# Patient Record
Sex: FEMALE | Race: WHITE | NOT HISPANIC OR LATINO
[De-identification: names, ages, dates, MRNs, and addresses within clinical notes are randomized per-mention and may not be internally consistent; named-entity substitution may affect disease eponyms.]

---

## 2019-08-09 PROBLEM — Z00.00 ENCOUNTER FOR PREVENTIVE HEALTH EXAMINATION: Status: ACTIVE | Noted: 2019-08-09

## 2019-08-12 ENCOUNTER — APPOINTMENT (OUTPATIENT)
Dept: OTOLARYNGOLOGY | Facility: CLINIC | Age: 49
End: 2019-08-12
Payer: COMMERCIAL

## 2019-08-12 VITALS
WEIGHT: 168 LBS | BODY MASS INDEX: 27 KG/M2 | HEART RATE: 85 BPM | TEMPERATURE: 98.1 F | OXYGEN SATURATION: 97 % | DIASTOLIC BLOOD PRESSURE: 68 MMHG | HEIGHT: 66 IN | SYSTOLIC BLOOD PRESSURE: 103 MMHG

## 2019-08-12 PROCEDURE — 31575 DIAGNOSTIC LARYNGOSCOPY: CPT

## 2019-08-12 PROCEDURE — 99203 OFFICE O/P NEW LOW 30 MIN: CPT | Mod: 25

## 2019-08-13 ENCOUNTER — TRANSCRIPTION ENCOUNTER (OUTPATIENT)
Age: 49
End: 2019-08-13

## 2019-08-13 NOTE — HISTORY OF PRESENT ILLNESS
[de-identified] : 48F w/ PMH of asthma, who was referred for a right salivary/parotid mass that has been slowly growing per patient over two years. Patient reports that the right sided mass started around the time she had right sided dental work, but it continued to persist and even grow which prompted her to see her PCP. 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 Patient admits to mild TTP, but denies any trismus or difficulty chewing or swallowing.

## 2019-08-13 NOTE — ASSESSMENT
[FreeTextEntry1] : 48F w/ PMH of asthma, here with 2 years of enlarged right salivary vs parotid glad, no formal imaging performed, FNA path shows SUMP.\par \par Plan: \par - MRI O/F/N and then return to clinic to be seen\par - patient would like to get a second opinion and then will return to the office if decides to proceed with Dr. Kulkarni

## 2019-08-13 NOTE — REVIEW OF SYSTEMS
[As Noted in HPI] : as noted in HPI [Swelling Neck] : swelling neck [Negative] : Constitutional [Throat Dryness] : no throat dryness

## 2019-08-13 NOTE — PHYSICAL EXAM
[Normal] : no abnormal secretions [Midline] : trachea located in midline position [Laryngoscopy Performed] : laryngoscopy was performed, see procedure section for findings [de-identified] : no other palpable LNs [de-identified] : right submandibular region swelling and palpable mass, no visible masses or lesions [de-identified] : equal vocal cord movement, no masses or lesions seen

## 2019-08-16 ENCOUNTER — APPOINTMENT (OUTPATIENT)
Dept: MRI IMAGING | Facility: HOSPITAL | Age: 49
End: 2019-08-16

## 2019-08-22 ENCOUNTER — OUTPATIENT (OUTPATIENT)
Dept: OUTPATIENT SERVICES | Facility: HOSPITAL | Age: 49
LOS: 1 days | End: 2019-08-22
Payer: COMMERCIAL

## 2019-08-22 DIAGNOSIS — D49.0 NEOPLASM OF UNSPECIFIED BEHAVIOR OF DIGESTIVE SYSTEM: ICD-10-CM

## 2019-08-23 ENCOUNTER — RESULT REVIEW (OUTPATIENT)
Age: 49
End: 2019-08-23

## 2019-08-23 PROCEDURE — 88321 CONSLTJ&REPRT SLD PREP ELSWR: CPT

## 2019-08-26 ENCOUNTER — APPOINTMENT (OUTPATIENT)
Dept: OTOLARYNGOLOGY | Facility: CLINIC | Age: 49
End: 2019-08-26
Payer: COMMERCIAL

## 2019-08-26 VITALS
SYSTOLIC BLOOD PRESSURE: 115 MMHG | HEIGHT: 66 IN | HEART RATE: 64 BPM | BODY MASS INDEX: 27 KG/M2 | WEIGHT: 168 LBS | OXYGEN SATURATION: 98 % | DIASTOLIC BLOOD PRESSURE: 73 MMHG

## 2019-08-26 DIAGNOSIS — D49.0 NEOPLASM OF UNSPECIFIED BEHAVIOR OF DIGESTIVE SYSTEM: ICD-10-CM

## 2019-08-26 DIAGNOSIS — K11.8 OTHER DISEASES OF SALIVARY GLANDS: ICD-10-CM

## 2019-08-26 PROCEDURE — 99213 OFFICE O/P EST LOW 20 MIN: CPT

## 2019-08-26 NOTE — PHYSICAL EXAM
[de-identified] : Facial expression and movement symmetrical. Right tail of parotid tumour visible on examination.

## 2019-08-26 NOTE — REASON FOR VISIT
[Subsequent Evaluation] : a subsequent evaluation for [FreeTextEntry2] : right tail of the parotid tumour

## 2019-08-26 NOTE — ASSESSMENT
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 This clinically and radiographically appears to be a benign tumour. The treatment would be superficial parotidectomy. We've discussed the risks in detail including but not limited to a small risk of injury of the FN both transient or permanent (<1%), risk of kelsey syndrome, paresthesia and recovery period and anesthesia risks. Ms Irwin and her family has been getting many second opinions and have not yet committed to a surgeon. We've reassured that this operation is routine.\par \par Plan: \par 1. right superficial parotidectomy has been recommended. Ms Irwin will be in touch with our office should she want to proceed with an operation.

## 2019-08-27 LAB — NON-GYNECOLOGICAL CYTOLOGY STUDY: SIGNIFICANT CHANGE UP

## 2019-09-30 VITALS
WEIGHT: 167.55 LBS | OXYGEN SATURATION: 97 % | HEART RATE: 73 BPM | SYSTOLIC BLOOD PRESSURE: 97 MMHG | DIASTOLIC BLOOD PRESSURE: 62 MMHG | RESPIRATION RATE: 16 BRPM | HEIGHT: 66 IN | TEMPERATURE: 98 F

## 2019-09-30 NOTE — ASU PREOP CHECKLIST - SELECT TESTS ORDERED
Urinalysis/CBC/CMP/INR/CXR/EKG/PT/PTT CXR/Urinalysis/EKG/INR/CBC/CMP/PT/PTT/ucg Urinalysis/INR/CXR/CBC/PT/PTT/EKG/ucg negative/CMP

## 2019-09-30 NOTE — ASU PATIENT PROFILE, ADULT - PMH
Asthma    Chronic lower back pain    Herniated disc, cervical  C-Spine and L4-L5  Hypothyroid    Uterine fibroid Anxiety    Asthma    Chronic lower back pain    Depression    Herniated disc, cervical  C-Spine and L4-L5  Hypothyroid    In vitro fertilization    Uterine fibroid Anxiety    Asthma    Chronic lower back pain    Depression    Herniated disc, cervical  C-Spine and L4-L5  Hyperthyroidism    In vitro fertilization    Uterine fibroid

## 2019-10-01 ENCOUNTER — RESULT REVIEW (OUTPATIENT)
Age: 49
End: 2019-10-01

## 2019-10-01 ENCOUNTER — APPOINTMENT (OUTPATIENT)
Dept: OTOLARYNGOLOGY | Facility: HOSPITAL | Age: 49
End: 2019-10-01

## 2019-10-01 ENCOUNTER — OUTPATIENT (OUTPATIENT)
Dept: OUTPATIENT SERVICES | Facility: HOSPITAL | Age: 49
LOS: 1 days | Discharge: HOME | End: 2019-10-01
Payer: COMMERCIAL

## 2019-10-01 DIAGNOSIS — Z98.890 OTHER SPECIFIED POSTPROCEDURAL STATES: Chronic | ICD-10-CM

## 2019-10-01 DIAGNOSIS — Z98.891 HISTORY OF UTERINE SCAR FROM PREVIOUS SURGERY: Chronic | ICD-10-CM

## 2019-10-01 PROCEDURE — 38500 BIOPSY/REMOVAL LYMPH NODES: CPT | Mod: RT

## 2019-10-01 PROCEDURE — 42415 EXCISE PAROTID GLAND/LESION: CPT | Mod: RT

## 2019-10-01 RX ORDER — ONDANSETRON 8 MG/1
4 TABLET, FILM COATED ORAL EVERY 6 HOURS
Refills: 0 | Status: DISCONTINUED | OUTPATIENT
Start: 2019-10-01 | End: 2019-10-02

## 2019-10-01 RX ORDER — ALBUTEROL 90 UG/1
1 AEROSOL, METERED ORAL EVERY 6 HOURS
Refills: 0 | Status: DISCONTINUED | OUTPATIENT
Start: 2019-10-01 | End: 2019-10-02

## 2019-10-01 RX ORDER — ESCITALOPRAM OXALATE 10 MG/1
2.5 TABLET, FILM COATED ORAL DAILY
Refills: 0 | Status: COMPLETED | OUTPATIENT
Start: 2019-10-01 | End: 2019-10-01

## 2019-10-01 RX ORDER — GABAPENTIN 400 MG/1
300 CAPSULE ORAL DAILY
Refills: 0 | Status: DISCONTINUED | OUTPATIENT
Start: 2019-10-01 | End: 2019-10-01

## 2019-10-01 RX ORDER — SODIUM CHLORIDE 9 MG/ML
1000 INJECTION, SOLUTION INTRAVENOUS
Refills: 0 | Status: DISCONTINUED | OUTPATIENT
Start: 2019-10-01 | End: 2019-10-02

## 2019-10-01 RX ORDER — ACETAMINOPHEN 500 MG
650 TABLET ORAL EVERY 4 HOURS
Refills: 0 | Status: DISCONTINUED | OUTPATIENT
Start: 2019-10-01 | End: 2019-10-02

## 2019-10-01 RX ORDER — CEFAZOLIN SODIUM 1 G
1000 VIAL (EA) INJECTION EVERY 8 HOURS
Refills: 0 | Status: DISCONTINUED | OUTPATIENT
Start: 2019-10-01 | End: 2019-10-02

## 2019-10-01 RX ORDER — CEPHALEXIN 500 MG
1 CAPSULE ORAL
Qty: 4 | Refills: 0
Start: 2019-10-01 | End: 2019-10-01

## 2019-10-01 RX ORDER — GABAPENTIN 400 MG/1
300 CAPSULE ORAL AT BEDTIME
Refills: 0 | Status: DISCONTINUED | OUTPATIENT
Start: 2019-10-01 | End: 2019-10-02

## 2019-10-01 RX ORDER — IBUPROFEN 200 MG
400 TABLET ORAL EVERY 4 HOURS
Refills: 0 | Status: DISCONTINUED | OUTPATIENT
Start: 2019-10-01 | End: 2019-10-02

## 2019-10-01 RX ADMIN — Medication 400 MILLIGRAM(S): at 11:20

## 2019-10-01 RX ADMIN — Medication 100 MILLIGRAM(S): at 17:39

## 2019-10-01 RX ADMIN — GABAPENTIN 300 MILLIGRAM(S): 400 CAPSULE ORAL at 21:38

## 2019-10-01 RX ADMIN — Medication 650 MILLIGRAM(S): at 18:00

## 2019-10-01 RX ADMIN — Medication 400 MILLIGRAM(S): at 11:53

## 2019-10-01 RX ADMIN — Medication 650 MILLIGRAM(S): at 19:00

## 2019-10-01 RX ADMIN — SODIUM CHLORIDE 75 MILLILITER(S): 9 INJECTION, SOLUTION INTRAVENOUS at 12:01

## 2019-10-01 RX ADMIN — ESCITALOPRAM OXALATE 2.5 MILLIGRAM(S): 10 TABLET, FILM COATED ORAL at 11:53

## 2019-10-01 NOTE — BRIEF OPERATIVE NOTE - OPERATION/FINDINGS
~1cm periparotid/perifacial lymph node sent for frozen c/w benign lymph node; 1.5cm bilobed poorly encapsulated lesion in right tail of parotid

## 2019-10-01 NOTE — PROGRESS NOTE ADULT - SUBJECTIVE AND OBJECTIVE BOX
ENT POSTOP CHECK NOTE    HPI: 49yoF with R tail of parotid lesion now s/p R superficial parotidectomy.     Interval: Seen and examined at bedside. PHUONG. Doing well. pain controlled with tylenol and motrin prn. tolerating PO. Denies CP, SOB, n/v. CARMEN output minimal     Vital Signs Last 24 Hrs  T(C): 36.7 (01 Oct 2019 13:00), Max: 36.7 (01 Oct 2019 13:00)  T(F): 98 (01 Oct 2019 13:00), Max: 98 (01 Oct 2019 13:00)  HR: 77 (01 Oct 2019 13:00) (70 - 92)  BP: 100/66 (01 Oct 2019 13:00) (90/56 - 107/52)  BP(mean): 78 (01 Oct 2019 12:31) (69 - 78)  RR: 18 (01 Oct 2019 13:00) (12 - 18)  SpO2: 98% (01 Oct 2019 13:00) (97% - 100%)    PE:  NAD, A&Ox3  NC/AT, EOMI  mild R john weakness otherwise CN VII intact   Incision c/d/i, soft, flat  CARMEN bulb holding suction, sanginous  HAYNES, wwp    A/P: 49yoF with R tail of parotid lesions s/p R superficial parotidectomy 10/1    -prn pain control with tylenol/motrin  -prn nausea  -regular diet as tolerated  -d/c IVF when tolerating adequate PO  -home meds   -CARMEN drain care  -Ancef for abx ppx  -up ad alexx  -SCDs    Dispo: regular room, 23hr obs     Seen by and d/w attending

## 2019-10-01 NOTE — PROGRESS NOTE ADULT - SUBJECTIVE AND OBJECTIVE BOX
Immediately post-op from right parotidectomy, patient was asked to smile and pucker her lips, both showing no signs of facial nerve paresis. Patient was later seen by attending physician within one hour after surgery and patient showed signs of mild marginal branch nerve paresis. Immediately post-op from right parotidectomy, patient was asked to smile and pucker her lips, both showing no signs of facial nerve paresis. Patient was later seen by attending physician within one hour after surgery and patient showed signs of mild marginal branch nerve paresis. Will continue to monitor this throughout post-op course.

## 2019-10-02 VITALS
SYSTOLIC BLOOD PRESSURE: 103 MMHG | DIASTOLIC BLOOD PRESSURE: 69 MMHG | TEMPERATURE: 98 F | HEART RATE: 73 BPM | OXYGEN SATURATION: 95 % | RESPIRATION RATE: 14 BRPM

## 2019-10-02 LAB — SURGICAL PATHOLOGY STUDY: SIGNIFICANT CHANGE UP

## 2019-10-02 PROCEDURE — 42415 EXCISE PAROTID GLAND/LESION: CPT | Mod: RT

## 2019-10-02 PROCEDURE — C1889: CPT

## 2019-10-02 PROCEDURE — 88307 TISSUE EXAM BY PATHOLOGIST: CPT

## 2019-10-02 PROCEDURE — 88333 PATH CONSLTJ SURG CYTO XM 1: CPT

## 2019-10-02 PROCEDURE — 88331 PATH CONSLTJ SURG 1 BLK 1SPC: CPT

## 2019-10-02 PROCEDURE — 88305 TISSUE EXAM BY PATHOLOGIST: CPT

## 2019-10-02 RX ORDER — GABAPENTIN 400 MG/1
1 CAPSULE ORAL
Qty: 0 | Refills: 0 | DISCHARGE
Start: 2019-10-02

## 2019-10-02 RX ORDER — ACETAMINOPHEN 500 MG
2 TABLET ORAL
Qty: 0 | Refills: 0 | DISCHARGE
Start: 2019-10-02

## 2019-10-02 RX ORDER — IBUPROFEN 200 MG
1 TABLET ORAL
Qty: 0 | Refills: 0 | DISCHARGE
Start: 2019-10-02

## 2019-10-02 RX ORDER — BENZOCAINE AND MENTHOL 5; 1 G/100ML; G/100ML
1 LIQUID ORAL
Refills: 0 | Status: DISCONTINUED | OUTPATIENT
Start: 2019-10-02 | End: 2019-10-02

## 2019-10-02 RX ADMIN — Medication 400 MILLIGRAM(S): at 05:35

## 2019-10-02 RX ADMIN — Medication 100 MILLIGRAM(S): at 00:07

## 2019-10-02 RX ADMIN — Medication 400 MILLIGRAM(S): at 00:11

## 2019-10-02 RX ADMIN — BENZOCAINE AND MENTHOL 1 LOZENGE: 5; 1 LIQUID ORAL at 05:33

## 2019-10-02 RX ADMIN — Medication 400 MILLIGRAM(S): at 01:11

## 2019-10-02 RX ADMIN — Medication 100 MILLIGRAM(S): at 09:57

## 2019-10-02 RX ADMIN — Medication 400 MILLIGRAM(S): at 06:35

## 2019-10-02 NOTE — PROGRESS NOTE ADULT - SUBJECTIVE AND OBJECTIVE BOX
ENT Progress NOTE    HPI: 49yoF with R tail of parotid lesion now POD1 s/p R superficial parotidectomy.     Interval: Seen and examined at bedside. PHUONG. Doing well. pain controlled with tylenol and motrin prn. tolerating PO. Denies CP, SOB, n/v. CARMEN output minimal, removed     Vital Signs Last 24 Hrs  T(C): 36.9 (02 Oct 2019 08:38), Max: 37.1 (01 Oct 2019 15:51)  T(F): 98.4 (02 Oct 2019 08:38), Max: 98.7 (01 Oct 2019 15:51)  HR: 73 (02 Oct 2019 08:38) (65 - 88)  BP: 103/69 (02 Oct 2019 08:38) (90/56 - 107/68)  BP(mean): 78 (01 Oct 2019 12:31) (71 - 78)  RR: 14 (02 Oct 2019 08:38) (14 - 18)  SpO2: 95% (02 Oct 2019 08:38) (95% - 99%)Vital Signs Last 24 Hrs  T(C): 36.7 (01 Oct 2019 13:00), Max: 36.7 (01 Oct 2019 13:00)  T(F): 98 (01 Oct 2019 13:00), Max: 98 (01 Oct 2019 13:00)  HR: 77 (01 Oct 2019 13:00) (70 - 92)  BP: 100/66 (01 Oct 2019 13:00) (90/56 - 107/52)  BP(mean): 78 (01 Oct 2019 12:31) (69 - 78)  RR: 18 (01 Oct 2019 13:00) (12 - 18)  SpO2: 98% (01 Oct 2019 13:00) (97% - 100%)    PE:  NAD, A&Ox3  NC/AT, EOMI  mild R john weakness otherwise CN VII intact   Incision c/d/i, soft, flat  CARMEN bulb holding suction, sanginous- removed   HAYNES, wwp    A/P: 49yoF with R tail of parotid lesions s/p R superficial parotidectomy 10/1, progressing well postoperatively   - Stable for discharge home   - Tylenol/motrin PRN for pain   - Return precautions discussed with patient, verbalized understanding   - Follow up in 1 week with Dr. Kulkarni

## 2019-10-04 PROBLEM — F32.9 MAJOR DEPRESSIVE DISORDER, SINGLE EPISODE, UNSPECIFIED: Chronic | Status: ACTIVE | Noted: 2019-09-30

## 2019-10-04 PROBLEM — J45.909 UNSPECIFIED ASTHMA, UNCOMPLICATED: Chronic | Status: ACTIVE | Noted: 2019-09-30

## 2019-10-04 PROBLEM — M54.5 LOW BACK PAIN: Chronic | Status: ACTIVE | Noted: 2019-09-30

## 2019-10-04 PROBLEM — Z31.83 ENCOUNTER FOR ASSISTED REPRODUCTIVE FERTILITY PROCEDURE CYCLE: Chronic | Status: ACTIVE | Noted: 2019-09-30

## 2019-10-04 PROBLEM — F41.9 ANXIETY DISORDER, UNSPECIFIED: Chronic | Status: ACTIVE | Noted: 2019-09-30

## 2019-10-04 PROBLEM — D25.9 LEIOMYOMA OF UTERUS, UNSPECIFIED: Chronic | Status: ACTIVE | Noted: 2019-09-30

## 2019-10-04 PROBLEM — E05.90 THYROTOXICOSIS, UNSPECIFIED WITHOUT THYROTOXIC CRISIS OR STORM: Chronic | Status: ACTIVE | Noted: 2019-10-01

## 2019-10-04 PROBLEM — M50.20 OTHER CERVICAL DISC DISPLACEMENT, UNSPECIFIED CERVICAL REGION: Chronic | Status: ACTIVE | Noted: 2019-09-30

## 2019-10-07 ENCOUNTER — APPOINTMENT (OUTPATIENT)
Dept: OTOLARYNGOLOGY | Facility: CLINIC | Age: 49
End: 2019-10-07
Payer: COMMERCIAL

## 2019-10-07 VITALS
BODY MASS INDEX: 27 KG/M2 | HEART RATE: 73 BPM | SYSTOLIC BLOOD PRESSURE: 105 MMHG | OXYGEN SATURATION: 98 % | HEIGHT: 66 IN | DIASTOLIC BLOOD PRESSURE: 72 MMHG | WEIGHT: 168 LBS

## 2019-10-07 PROCEDURE — 99024 POSTOP FOLLOW-UP VISIT: CPT

## 2019-10-07 NOTE — ASSESSMENT
[FreeTextEntry1] : Assessment: \par 1. pleomorphic adenoma of right parotid removed- POD6 from superficial parotidectomy\par 2. residual right marginal mandibular nerve weakness-suspect neuropraxia from swelling\par \par Plan: \par 1. f/u 1 month to reassess marginal mandibular nerve recovery\par 2. will need surveillance MRI at 6 month \par 3. avoid heavy lifting for an additional week, may return to work with modified activities.

## 2019-10-07 NOTE — PHYSICAL EXAM
[Normal] : no mass and no adenopathy [de-identified] : Neck incision healing well.  [de-identified] : Almost invisible facial incision going retrotragally. Earlobe in perfect position. \par No seroma/hematoma. Mild bruising from NIM periorally. \par Right marginal mandibular nerve weakness, but tone is normal. Small area of injury in right lower mucosal lip from biting. \par Otherwise, facial nerve function is preserved.

## 2019-10-07 NOTE — HISTORY OF PRESENT ILLNESS
[de-identified] : 50 yo F POD6 from R superficial parotidectomy for pleomorphic adenoma. \par  [FreeTextEntry1] : Pt seen in clinic, recovered well post op. \par In PACU developed R marginal mandibular nerve weakness which is persisting. Because of that she is biting her lower lip intermittently. \par No facial swelling, no Sx of Nithya syndrome. Facial numbness and earlobe numbness.

## 2019-11-04 ENCOUNTER — APPOINTMENT (OUTPATIENT)
Dept: OTOLARYNGOLOGY | Facility: CLINIC | Age: 49
End: 2019-11-04
Payer: COMMERCIAL

## 2019-11-04 VITALS
HEIGHT: 66 IN | OXYGEN SATURATION: 97 % | WEIGHT: 168 LBS | DIASTOLIC BLOOD PRESSURE: 73 MMHG | HEART RATE: 80 BPM | BODY MASS INDEX: 27 KG/M2 | SYSTOLIC BLOOD PRESSURE: 114 MMHG

## 2019-11-04 PROCEDURE — 99024 POSTOP FOLLOW-UP VISIT: CPT

## 2019-11-04 NOTE — ASSESSMENT
[FreeTextEntry1] : Assessment: \par 1. complete removal of pleomorphic adenoma of right parotid-needs surveillance imaging 6 months post op\par 2. residual right marginal mandibular nerve weakness\par \par Talked about nerve regeneration and that it may take 6-9 months. The fact that she is developing some twitching in the left lower lip is promising. Talked about balancing smile by weakening right OLLIE with neuromodulators. She will think about it. \par \par Plan: \par 1. Follow up in 6 weeks to reassess marginal nerve recovery\par 2. needs MRI 6 months post op\par 3. May resume normal activity

## 2019-11-04 NOTE — HISTORY OF PRESENT ILLNESS
[de-identified] : 48 yo F post right superficial parotidectomy for pleomorphic adenoma. Postoperative left marginal mandibular nerve weakness.  [FreeTextEntry1] : Since our last follow up developed slight sensation of movement in left lower lip. Numbness in left face and neck improving. Scar is camouflaged nicely. Provided a work note today. \par Ongoing lip asymmetry and intermittently she bites left lower lip because of weakness in marginal mandibular nerve.

## 2019-11-04 NOTE — PHYSICAL EXAM
[de-identified] : :Almost invisible facial incision going retrotragally. Earlobe in great position. \par \par Right marginal mandibular nerve weakness, but tone is normal. \par \par Otherwise, facial nerve function is preserved.

## 2019-12-16 ENCOUNTER — APPOINTMENT (OUTPATIENT)
Dept: OTOLARYNGOLOGY | Facility: CLINIC | Age: 49
End: 2019-12-16
Payer: COMMERCIAL

## 2019-12-16 VITALS
SYSTOLIC BLOOD PRESSURE: 126 MMHG | DIASTOLIC BLOOD PRESSURE: 79 MMHG | HEIGHT: 66 IN | OXYGEN SATURATION: 98 % | HEART RATE: 84 BPM | WEIGHT: 168 LBS | BODY MASS INDEX: 27 KG/M2

## 2019-12-16 PROCEDURE — 99024 POSTOP FOLLOW-UP VISIT: CPT

## 2019-12-16 NOTE — HISTORY OF PRESENT ILLNESS
[de-identified] : 50 yo F post right superficial parotidectomy for pleomorphic adenoma. Postoperative left marginal mandibular nerve weakness. \par  [FreeTextEntry1] : Interval History: Since our last follow up developed objective movement in right lower lip. Numbness in right face and neck improving. Scar has healed. \par

## 2019-12-16 NOTE — PHYSICAL EXAM
[de-identified] : Improved right lower lip asymmetry. Improved tone. Asymmetry only obvious with big smile. \par Noticed lack of platysmal tightening on right side, that is secondary to surgery and will take 6 months from OR to recover because platysma is routinely transected in parotidectomy.

## 2019-12-16 NOTE — ASSESSMENT
[FreeTextEntry1] : Assessment: \par 1. right marginal mandibular weakness improving\par 2. removed pleomorphic adenoma of right parotid-needs surveillance imaging\par \par Plan: \par 1. needs surveillance MRI in March-April 2020\par 2. F/U 3 months for reassessment

## 2022-08-25 ENCOUNTER — APPOINTMENT (OUTPATIENT)
Dept: MRI IMAGING | Facility: HOSPITAL | Age: 52
End: 2022-08-25

## 2022-10-20 ENCOUNTER — APPOINTMENT (OUTPATIENT)
Dept: SPINE | Facility: CLINIC | Age: 52
End: 2022-10-20

## 2022-10-20 DIAGNOSIS — Z86.39 PERSONAL HISTORY OF OTHER ENDOCRINE, NUTRITIONAL AND METABOLIC DISEASE: ICD-10-CM

## 2022-10-20 DIAGNOSIS — D49.2 NEOPLASM OF UNSPECIFIED BEHAVIOR OF BONE, SOFT TISSUE, AND SKIN: ICD-10-CM

## 2022-10-20 DIAGNOSIS — J45.20 MILD INTERMITTENT ASTHMA, UNCOMPLICATED: ICD-10-CM

## 2022-10-20 DIAGNOSIS — F12.91 CANNABIS USE, UNSPECIFIED, IN REMISSION: ICD-10-CM

## 2022-10-20 DIAGNOSIS — Z80.8 FAMILY HISTORY OF MALIGNANT NEOPLASM OF OTHER ORGANS OR SYSTEMS: ICD-10-CM

## 2022-10-20 DIAGNOSIS — Z87.891 PERSONAL HISTORY OF NICOTINE DEPENDENCE: ICD-10-CM

## 2022-10-20 PROCEDURE — 99203 OFFICE O/P NEW LOW 30 MIN: CPT | Mod: 95

## 2022-10-20 PROCEDURE — 99443: CPT | Mod: 95

## 2022-10-20 RX ORDER — DEXTROAMPHETAMINE SACCHARATE, AMPHETAMINE ASPARTATE, DEXTROAMPHETAMINE SULFATE AND AMPHETAMINE SULFATE 1.25; 1.25; 1.25; 1.25 MG/1; MG/1; MG/1; MG/1
5 TABLET ORAL
Qty: 60 | Refills: 0 | Status: ACTIVE | COMMUNITY
Start: 2022-05-05

## 2022-10-20 RX ORDER — BECLOMETHASONE DIPROPIONATE HFA 40 UG/1
40 AEROSOL, METERED RESPIRATORY (INHALATION)
Qty: 10 | Refills: 0 | Status: ACTIVE | COMMUNITY
Start: 2022-08-10

## 2022-10-20 RX ORDER — LAMOTRIGINE 25 MG/1
25 TABLET ORAL
Qty: 45 | Refills: 0 | Status: ACTIVE | COMMUNITY
Start: 2022-05-26

## 2022-10-20 RX ORDER — GABAPENTIN 300 MG/1
300 CAPSULE ORAL
Qty: 90 | Refills: 0 | Status: ACTIVE | COMMUNITY
Start: 2022-05-09

## 2022-10-20 RX ORDER — CLINDAMYCIN PHOSPHATE 10 MG/ML
1 LOTION TOPICAL
Qty: 60 | Refills: 0 | Status: ACTIVE | COMMUNITY
Start: 2022-07-13

## 2022-10-20 RX ORDER — COVID-19 MOLECULAR TEST ASSAY
KIT MISCELLANEOUS
Qty: 1 | Refills: 0 | Status: ACTIVE | COMMUNITY
Start: 2022-09-19

## 2022-10-20 NOTE — ASSESSMENT
[FreeTextEntry1] : Incidental finding of thoracic vertebral body lesion during ENT follow up for parotid tumor. \par \par PLAN\par - MRI T-spine w/wo to r/o malignancy given recent h/o parotid adenoma\par - f/u after images to review with Dr. Mane

## 2022-10-20 NOTE — PHYSICAL EXAM
[de-identified] : MR neck w/wo on 8/25/2022 @ [facility] showed 7 mm hyperintense lesion @ T3 vertebral body.

## 2022-11-01 PROBLEM — Z87.891 FORMER SMOKER: Status: ACTIVE | Noted: 2019-08-13

## 2022-11-01 PROBLEM — J45.20 MILD INTERMITTENT ASTHMA: Status: RESOLVED | Noted: 2019-08-13 | Resolved: 2022-11-01

## 2022-11-01 PROBLEM — F12.91 HISTORY OF MARIJUANA USE: Status: ACTIVE | Noted: 2019-08-13

## 2022-11-01 PROBLEM — D49.2 THORACIC SPINE TUMOR: Status: ACTIVE | Noted: 2022-10-20

## 2022-11-01 PROBLEM — Z86.39 HISTORY OF HYPERTHYROIDISM: Status: RESOLVED | Noted: 2019-08-13 | Resolved: 2022-11-01

## 2022-11-01 PROBLEM — Z80.8 FAMILY HISTORY OF MALIGNANT NEOPLASM OF SKIN: Status: ACTIVE | Noted: 2019-08-13

## 2022-11-01 NOTE — HISTORY OF PRESENT ILLNESS
[de-identified] : 53 yo F with parotid tumor (s/p resection, pathology- pleomorphic adenoma) presents recent MRI finding of thoracic spine enhancing lesion.\par She denies back pain, LE weakness, balance problem BB, dysfunction.

## 2022-11-01 NOTE — REASON FOR VISIT
[Initial Visit] : an initial visit for [FreeTextEntry2] : thoracic spine lesion, referred by CHRISTO (Dr. Babin)

## 2022-11-01 NOTE — PHYSICAL EXAM
[de-identified] : MR neck w/wo on 8/25/2022 @ [facility] showed 7 mm hyperintense lesion @ T3 vertebral body.

## 2023-03-14 ENCOUNTER — APPOINTMENT (OUTPATIENT)
Dept: SPINE | Facility: CLINIC | Age: 53
End: 2023-03-14
Payer: COMMERCIAL

## 2023-03-14 DIAGNOSIS — M47.814 SPONDYLOSIS W/OUT MYELOPATHY OR RADICULOPATHY, THORACIC REGION: ICD-10-CM

## 2023-03-14 PROCEDURE — 99214 OFFICE O/P EST MOD 30 MIN: CPT | Mod: 95

## 2023-05-10 PROBLEM — M47.814 THORACIC SPONDYLOSIS: Status: ACTIVE | Noted: 2023-05-10

## 2023-05-10 NOTE — REASON FOR VISIT
[Home] : at home, [unfilled] , at the time of the visit. [Medical Office: ([facility] [place]___] : at the medical office located in  [Patient] : the patient [Self] : self [Follow-Up Visit] : a follow-up visit for

## 2023-05-10 NOTE — DISCUSSION/SUMMARY
[de-identified] : This lesion is completely benign and nothing to worry about.  She needs no repeat imaging.\par \par Isra Mane M.D., M.Sc.\par \par Department of Neurosurgery\par [facility]\par [facility]\par [facility]\par [place], [place]\par [email or web address]\par [phone]

## 2023-10-04 ENCOUNTER — NON-APPOINTMENT (OUTPATIENT)
Age: 53
End: 2023-10-04

## 2024-02-13 ENCOUNTER — NON-APPOINTMENT (OUTPATIENT)
Age: 54
End: 2024-02-13

## 2024-06-13 ENCOUNTER — NON-APPOINTMENT (OUTPATIENT)
Age: 54
End: 2024-06-13

## 2024-10-21 ENCOUNTER — NON-APPOINTMENT (OUTPATIENT)
Age: 54
End: 2024-10-21

## 2024-11-04 ENCOUNTER — NON-APPOINTMENT (OUTPATIENT)
Age: 54
End: 2024-11-04

## 2024-11-12 ENCOUNTER — NON-APPOINTMENT (OUTPATIENT)
Age: 54
End: 2024-11-12

## 2024-11-12 ENCOUNTER — APPOINTMENT (OUTPATIENT)
Dept: PULMONOLOGY | Facility: CLINIC | Age: 54
End: 2024-11-12
Payer: COMMERCIAL

## 2024-11-12 VITALS
WEIGHT: 175 LBS | TEMPERATURE: 97.7 F | HEIGHT: 66 IN | BODY MASS INDEX: 28.12 KG/M2 | OXYGEN SATURATION: 96 % | DIASTOLIC BLOOD PRESSURE: 75 MMHG | HEART RATE: 86 BPM | SYSTOLIC BLOOD PRESSURE: 118 MMHG

## 2024-11-12 DIAGNOSIS — R06.02 SHORTNESS OF BREATH: ICD-10-CM

## 2024-11-12 DIAGNOSIS — J30.2 OTHER SEASONAL ALLERGIC RHINITIS: ICD-10-CM

## 2024-11-12 DIAGNOSIS — J45.40 MODERATE PERSISTENT ASTHMA, UNCOMPLICATED: ICD-10-CM

## 2024-11-12 PROCEDURE — G2211 COMPLEX E/M VISIT ADD ON: CPT | Mod: NC

## 2024-11-12 PROCEDURE — 99204 OFFICE O/P NEW MOD 45 MIN: CPT

## 2024-11-12 RX ORDER — BUDESONIDE AND FORMOTEROL FUMARATE DIHYDRATE 160; 4.5 UG/1; UG/1
160-4.5 AEROSOL RESPIRATORY (INHALATION) TWICE DAILY
Qty: 1 | Refills: 11 | Status: ACTIVE | COMMUNITY
Start: 2024-11-12 | End: 1900-01-01

## 2024-11-12 RX ORDER — ALBUTEROL 90 MCG
AEROSOL (GRAM) INHALATION
Refills: 0 | Status: ACTIVE | COMMUNITY

## 2025-01-14 ENCOUNTER — APPOINTMENT (OUTPATIENT)
Dept: PULMONOLOGY | Facility: CLINIC | Age: 55
End: 2025-01-14
Payer: COMMERCIAL

## 2025-01-14 VITALS
HEIGHT: 66 IN | SYSTOLIC BLOOD PRESSURE: 114 MMHG | TEMPERATURE: 98.1 F | WEIGHT: 175 LBS | OXYGEN SATURATION: 94 % | BODY MASS INDEX: 28.12 KG/M2 | DIASTOLIC BLOOD PRESSURE: 73 MMHG | HEART RATE: 78 BPM

## 2025-01-14 DIAGNOSIS — J45.40 MODERATE PERSISTENT ASTHMA, UNCOMPLICATED: ICD-10-CM

## 2025-01-14 DIAGNOSIS — J30.2 OTHER SEASONAL ALLERGIC RHINITIS: ICD-10-CM

## 2025-01-14 DIAGNOSIS — R06.02 SHORTNESS OF BREATH: ICD-10-CM

## 2025-01-14 PROCEDURE — 94060 EVALUATION OF WHEEZING: CPT

## 2025-01-14 PROCEDURE — G2211 COMPLEX E/M VISIT ADD ON: CPT | Mod: NC

## 2025-01-14 PROCEDURE — 99213 OFFICE O/P EST LOW 20 MIN: CPT | Mod: 25

## 2025-01-14 PROCEDURE — 94726 PLETHYSMOGRAPHY LUNG VOLUMES: CPT

## 2025-01-14 PROCEDURE — 94729 DIFFUSING CAPACITY: CPT

## 2025-01-14 PROCEDURE — ZZZZZ: CPT

## 2025-02-25 ENCOUNTER — NON-APPOINTMENT (OUTPATIENT)
Age: 55
End: 2025-02-25

## 2025-03-12 ENCOUNTER — APPOINTMENT (OUTPATIENT)
Facility: CLINIC | Age: 55
End: 2025-03-12
Payer: COMMERCIAL

## 2025-03-12 VITALS
SYSTOLIC BLOOD PRESSURE: 106 MMHG | WEIGHT: 170 LBS | HEIGHT: 63 IN | DIASTOLIC BLOOD PRESSURE: 66 MMHG | TEMPERATURE: 98.4 F | BODY MASS INDEX: 30.12 KG/M2 | HEART RATE: 89 BPM | OXYGEN SATURATION: 97 %

## 2025-03-12 DIAGNOSIS — Z00.00 ENCOUNTER FOR GENERAL ADULT MEDICAL EXAMINATION W/OUT ABNORMAL FINDINGS: ICD-10-CM

## 2025-03-12 DIAGNOSIS — Z80.3 FAMILY HISTORY OF MALIGNANT NEOPLASM OF BREAST: ICD-10-CM

## 2025-03-12 DIAGNOSIS — Z82.49 FAMILY HISTORY OF ISCHEMIC HEART DISEASE AND OTHER DISEASES OF THE CIRCULATORY SYSTEM: ICD-10-CM

## 2025-03-12 DIAGNOSIS — Z83.438 FAMILY HISTORY OF OTHER DISORDER OF LIPOPROTEIN METABOLISM AND OTHER LIPIDEMIA: ICD-10-CM

## 2025-03-12 PROCEDURE — 36415 COLL VENOUS BLD VENIPUNCTURE: CPT

## 2025-03-12 PROCEDURE — 99386 PREV VISIT NEW AGE 40-64: CPT

## 2025-03-12 RX ORDER — ESCITALOPRAM OXALATE 5 MG/1
TABLET, FILM COATED ORAL
Refills: 0 | Status: ACTIVE | COMMUNITY

## 2025-03-13 ENCOUNTER — TRANSCRIPTION ENCOUNTER (OUTPATIENT)
Age: 55
End: 2025-03-13

## 2025-03-13 LAB
25(OH)D3 SERPL-MCNC: 57.3 NG/ML
ALBUMIN SERPL ELPH-MCNC: 4.2 G/DL
ALP BLD-CCNC: 77 U/L
ALT SERPL-CCNC: 24 U/L
ANION GAP SERPL CALC-SCNC: 12 MMOL/L
AST SERPL-CCNC: 25 U/L
BASOPHILS # BLD AUTO: 0.04 K/UL
BASOPHILS NFR BLD AUTO: 0.5 %
BILIRUB SERPL-MCNC: 0.2 MG/DL
BUN SERPL-MCNC: 12 MG/DL
CALCIUM SERPL-MCNC: 9.6 MG/DL
CHLORIDE SERPL-SCNC: 107 MMOL/L
CO2 SERPL-SCNC: 23 MMOL/L
CREAT SERPL-MCNC: 0.7 MG/DL
EGFRCR SERPLBLD CKD-EPI 2021: 103 ML/MIN/1.73M2
EOSINOPHIL # BLD AUTO: 0.05 K/UL
EOSINOPHIL NFR BLD AUTO: 0.7 %
ESTIMATED AVERAGE GLUCOSE: 108 MG/DL
GLUCOSE SERPL-MCNC: 113 MG/DL
HBA1C MFR BLD HPLC: 5.4 %
HCT VFR BLD CALC: 39.9 %
HGB BLD-MCNC: 13.5 G/DL
IMM GRANULOCYTES NFR BLD AUTO: 0.3 %
LYMPHOCYTES # BLD AUTO: 2.19 K/UL
LYMPHOCYTES NFR BLD AUTO: 29.1 %
MAN DIFF?: NORMAL
MCHC RBC-ENTMCNC: 29.5 PG
MCHC RBC-ENTMCNC: 33.8 G/DL
MCV RBC AUTO: 87.3 FL
MONOCYTES # BLD AUTO: 0.53 K/UL
MONOCYTES NFR BLD AUTO: 7 %
NEUTROPHILS # BLD AUTO: 4.69 K/UL
NEUTROPHILS NFR BLD AUTO: 62.4 %
PLATELET # BLD AUTO: 253 K/UL
POTASSIUM SERPL-SCNC: 4.3 MMOL/L
PROT SERPL-MCNC: 6.5 G/DL
RBC # BLD: 4.57 M/UL
RBC # FLD: 12.7 %
SODIUM SERPL-SCNC: 142 MMOL/L
TSH SERPL-ACNC: 1.99 UIU/ML
VIT B12 SERPL-MCNC: 1210 PG/ML
WBC # FLD AUTO: 7.52 K/UL

## 2025-03-14 LAB
C TRACH RRNA SPEC QL NAA+PROBE: NOT DETECTED
HIV1+2 AB SPEC QL IA.RAPID: NONREACTIVE
MEV IGG FLD QL IA: 119 AU/ML
MEV IGG+IGM SER-IMP: POSITIVE
MUV AB SER-ACNC: POSITIVE
MUV IGG SER QL IA: 41.2 AU/ML
N GONORRHOEA RRNA SPEC QL NAA+PROBE: NOT DETECTED
RUBV IGG FLD-ACNC: 2.26 INDEX
RUBV IGG SER-IMP: POSITIVE
SOURCE AMPLIFICATION: NORMAL
T PALLIDUM AB SER QL IA: NEGATIVE
VZV AB TITR SER: POSITIVE
VZV IGG SER IF-ACNC: 30.7 S/CO

## 2025-03-18 ENCOUNTER — TRANSCRIPTION ENCOUNTER (OUTPATIENT)
Age: 55
End: 2025-03-18

## 2025-03-18 LAB
CHOLEST SERPL-MCNC: 182 MG/DL
HDLC SERPL-MCNC: 50 MG/DL
LDLC SERPL CALC-MCNC: 111 MG/DL
NONHDLC SERPL-MCNC: 133 MG/DL
TRIGL SERPL-MCNC: 123 MG/DL

## 2025-03-19 ENCOUNTER — TRANSCRIPTION ENCOUNTER (OUTPATIENT)
Age: 55
End: 2025-03-19

## 2025-03-27 ENCOUNTER — TRANSCRIPTION ENCOUNTER (OUTPATIENT)
Age: 55
End: 2025-03-27

## 2025-03-31 ENCOUNTER — TRANSCRIPTION ENCOUNTER (OUTPATIENT)
Age: 55
End: 2025-03-31

## 2025-04-21 ENCOUNTER — APPOINTMENT (OUTPATIENT)
Dept: OTOLARYNGOLOGY | Facility: CLINIC | Age: 55
End: 2025-04-21
Payer: COMMERCIAL

## 2025-04-21 DIAGNOSIS — Z78.9 OTHER SPECIFIED HEALTH STATUS: ICD-10-CM

## 2025-04-21 DIAGNOSIS — D49.0 NEOPLASM OF UNSPECIFIED BEHAVIOR OF DIGESTIVE SYSTEM: ICD-10-CM

## 2025-04-21 PROCEDURE — 99203 OFFICE O/P NEW LOW 30 MIN: CPT

## 2025-04-22 ENCOUNTER — APPOINTMENT (OUTPATIENT)
Dept: PULMONOLOGY | Facility: CLINIC | Age: 55
End: 2025-04-22
Payer: COMMERCIAL

## 2025-04-22 VITALS
SYSTOLIC BLOOD PRESSURE: 98 MMHG | WEIGHT: 167 LBS | HEART RATE: 87 BPM | OXYGEN SATURATION: 96 % | DIASTOLIC BLOOD PRESSURE: 55 MMHG | HEIGHT: 63 IN | BODY MASS INDEX: 29.59 KG/M2 | TEMPERATURE: 98.4 F

## 2025-04-22 DIAGNOSIS — J30.2 OTHER SEASONAL ALLERGIC RHINITIS: ICD-10-CM

## 2025-04-22 DIAGNOSIS — R06.02 SHORTNESS OF BREATH: ICD-10-CM

## 2025-04-22 DIAGNOSIS — J45.40 MODERATE PERSISTENT ASTHMA, UNCOMPLICATED: ICD-10-CM

## 2025-04-22 PROCEDURE — G2211 COMPLEX E/M VISIT ADD ON: CPT | Mod: NC

## 2025-04-22 PROCEDURE — 99214 OFFICE O/P EST MOD 30 MIN: CPT

## 2025-05-20 ENCOUNTER — NON-APPOINTMENT (OUTPATIENT)
Age: 55
End: 2025-05-20

## 2025-06-18 ENCOUNTER — TRANSCRIPTION ENCOUNTER (OUTPATIENT)
Age: 55
End: 2025-06-18

## 2025-08-25 DIAGNOSIS — F32.A ANXIETY DISORDER, UNSPECIFIED: ICD-10-CM

## 2025-08-25 DIAGNOSIS — F41.9 ANXIETY DISORDER, UNSPECIFIED: ICD-10-CM

## 2025-08-25 RX ORDER — ESCITALOPRAM OXALATE 5 MG/1
5 TABLET ORAL DAILY
Qty: 5 | Refills: 0 | Status: ACTIVE | COMMUNITY
Start: 2025-08-25 | End: 1900-01-01